# Patient Record
Sex: MALE | Race: BLACK OR AFRICAN AMERICAN | Employment: UNEMPLOYED | URBAN - METROPOLITAN AREA
[De-identification: names, ages, dates, MRNs, and addresses within clinical notes are randomized per-mention and may not be internally consistent; named-entity substitution may affect disease eponyms.]

---

## 2019-11-26 ENCOUNTER — HOSPICE ADMISSION (OUTPATIENT)
Dept: HOSPICE | Facility: HOSPICE | Age: 84
End: 2019-11-26
Payer: MEDICARE

## 2019-11-27 ENCOUNTER — HOSPITAL ENCOUNTER (INPATIENT)
Age: 84
LOS: 6 days | Discharge: HOSPICE | End: 2019-12-03
Attending: INTERNAL MEDICINE | Admitting: INTERNAL MEDICINE

## 2019-11-27 DIAGNOSIS — Z51.5 COMFORT MEASURES ONLY STATUS: ICD-10-CM

## 2019-11-27 DIAGNOSIS — I35.0 AORTIC VALVE STENOSIS, ETIOLOGY OF CARDIAC VALVE DISEASE UNSPECIFIED: ICD-10-CM

## 2019-11-27 DIAGNOSIS — N18.4 CKD (CHRONIC KIDNEY DISEASE), STAGE IV (HCC): ICD-10-CM

## 2019-11-27 DIAGNOSIS — C61 PROSTATE CANCER (HCC): ICD-10-CM

## 2019-11-27 PROCEDURE — 3336500001 HSPC ELECTION

## 2019-11-27 PROCEDURE — 99222 1ST HOSP IP/OBS MODERATE 55: CPT | Performed by: INTERNAL MEDICINE

## 2019-11-27 PROCEDURE — 0656 HSPC GENERAL INPATIENT

## 2019-11-27 RX ORDER — TRAZODONE HYDROCHLORIDE 50 MG/1
100 TABLET ORAL
Status: DISCONTINUED | OUTPATIENT
Start: 2019-11-27 | End: 2019-12-03 | Stop reason: HOSPADM

## 2019-11-27 RX ORDER — HALOPERIDOL 2 MG/ML
1 SOLUTION ORAL
Status: DISCONTINUED | OUTPATIENT
Start: 2019-11-27 | End: 2019-12-03 | Stop reason: HOSPADM

## 2019-11-27 RX ORDER — AMOXICILLIN 250 MG
1 CAPSULE ORAL 2 TIMES DAILY
Status: DISCONTINUED | OUTPATIENT
Start: 2019-11-27 | End: 2019-12-03 | Stop reason: HOSPADM

## 2019-11-27 RX ORDER — FENTANYL CITRATE 50 UG/ML
25 INJECTION, SOLUTION INTRAMUSCULAR; INTRAVENOUS
Status: DISCONTINUED | OUTPATIENT
Start: 2019-11-27 | End: 2019-12-03 | Stop reason: HOSPADM

## 2019-11-27 RX ORDER — FACIAL-BODY WIPES
10 EACH TOPICAL DAILY PRN
Status: DISCONTINUED | OUTPATIENT
Start: 2019-11-27 | End: 2019-12-03 | Stop reason: HOSPADM

## 2019-11-27 RX ORDER — LANOLIN ALCOHOL/MO/W.PET/CERES
3 CREAM (GRAM) TOPICAL
Status: DISCONTINUED | OUTPATIENT
Start: 2019-11-27 | End: 2019-12-03 | Stop reason: HOSPADM

## 2019-11-27 NOTE — HOSPICE
I met with Mr. Stacy Clayton, his brother, nephew, and grandson. Mr. Stacy Clayton indicated that he was comfortable. He was alert and oriented. Mr. Stacy Clayton appeared to tire easily. During the visit, Mr. Stacy Clayton and his family engaged in a life review that focused on his career as a . 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 Mr. Stacy Clayton seemed to find stephenie through his work. Mr. Stacy Clayton was actively involved in a [employer] where he had a leadership role. Mr. Stacy Clayton welcomed prayer at the conclusion of the visit. Mr. John richards was also present in the family room. He appeared to be well supported. Towards the end of the visit, Mr. Stacy Clayton seemed tired. He politely indicated that he needed time to rest. I verbalized understanding and offered a closing prayer. The family seemed appreciative of the visit and prayer.

## 2019-11-27 NOTE — PROGRESS NOTES
1230  Report received from [place].    1240  Pt lying in bed,asleep. Pt aroused to verbal stimuli. No co pain or dypsnea at this time. Numerous family at the bedside. 1  Pt  Visiting with family. No complaints. 1430  Pt asleep  NO facial grimacing, no shortness of breath. 1530  Pt ringing out. Pt stated he needed to use the restroom. Pt on the bed weston. Pt had a very large loose stool but did not void. Pt cleaned up, turned and repositioned. 36  Pt visiting with family. No needs. 1730  Pt getting a bath. Pt turned, repositioned. Pt very happy with his bath. No distress. Pt denies pain, shortness of breath or restlessness. 1830  Pt resting. 1900  Report given.

## 2019-11-27 NOTE — HOSPICE
[facility]  [phone]    Social Work Admission Note  Patient Name: Frandy Watt  YOB: 1931  Age: 80 y.o. Date of Visit: 11/27/19  Facility of Care: [facility]  Patient Room: 11/01     Hospice Attending: George Stinson MD  Hospice Diagnosis: end stage heart disease, ESRD    Level of Care:    [x]  GIP    []  Respite   []  Routine    NARRATIVE   LCSW and medical team met with patient and 2 of his children (Rossana Bautista) at bedside during team rounds. Patient received lying in bed and initially sleeping but able to rouse and speak. He is pleasant and able to answer most questions appropriately however unintelligible at times. Patient had dialysis last on Monday and also has prostate cancer and heart disease. Children have been supportive during hospitalization and though they would take him home, are expecting a diaz decline and hope for patient to stay at [facility]. They are aware that there may be a financial obligation if patient switches to Routine LOC from GIP. Rossana Bautista shared that their mother has Alzheimers and patient has been her caregiver up until September of this year when his health took a turn. The family is working on placement for their mother at this time as well which has been difficult. They do have a large supportive family and will have many visitors today and tomorrow. Family plans to bring Thanksgiving meal to [facility]. LCSW will remain available for support.     ADVANCE CARE PLANNING    Code Status: DNR  Durable DNR: x Yes  _ No  Advance Care Planning 11/27/2019   Patient's Healthcare Decision Maker is: Legal Next of Kin   Confirm Advance Directive Yes, not on file   Does the patient have other document types Do Not Resuscitate       Relationship Status:  []  Single     [x]        []      []  Domestic Partner     []  /  []  Common Law  []    []  Unknown    If in a relationship, name of partner/spouse:  Duration of relationship:    Yazdanism: No Anabaptist on file     Home: TBD  Resources Provided: Supportive counseling, hospice info packet, [facility] for [facility] care    Social Work Initial Assessment     Gender:  male    Race/Ethnicity: ([place] all that apply)  []  American [place] [facility] or [place] Native  []    [x]  Black or [place] American  []   or   []  [facility] or [facility]  []  Rheta Hunting  []  Unknown      Service:    []  Yes   []  No       [x]  Unknown  Appropriate for Pinning Ceremony:   []  Yes      []  No  Is patient using VA benefits?    []  Yes      []  No     Primary Language: English  []   Needed  []   utilized during visit    Ability to express thoughts/needs/feelings  []  Expressed thoughts/feelings/needs without difficulty  [x]  Requires extra time and cuing  []  Speech limited single words  []  Uses only gestures (eye, blinking eye or head movement/pointing)  []  Unable to express thoughts/feelings/needs (speech unintelligible or inappropriate)  []  Unresponsive  Notes:      Mental Status:  []  Alert-oriented to:     []  Person     []  Place     []  Time  []  Comatose-responds to:    []   Verbal stimuli    []  Tactile stimuli    []  Painful stimuli  []  Forgetful  []  Disoriented/Confused  [x]  Lethargic  []  Agitated  []  Other (specify):    Notes:      Patients description of Illness/Current Health Status:    [x]  Patient unable to discuss  []  Patient unwilling to discuss  []  (Specify)        Knowledge/Understanding of Disease Process  Patient:    []  Demonstrates knowledge/understanding of disease process   []  Demonstrates knowledge/understanding of treatment plan   []  Demonstrates knowledge/understanding of prognosis   []  Demonstrates acceptance of prognosis   []  Demonstrates knowledge/understanding of resuscitation status   []  Other (specify)  Caregiver:   [x]  Demonstrates knowledge/understanding of disease process   [x] Demonstrates knowledge/understanding of treatment plan   [x]  Demonstrates knowledge/understanding of prognosis   [x]  Demonstrates acceptance of prognosis   []  Demonstrates knowledge/understanding of resuscitation status   []  Other (specify)  Notes:      Patients living arrangement/care setting:  Use the PRIOR COLUMN when the PATIENTS current health status necessitated a change in his/her primary residence. Prior Current Response              [x]             []    Patients own home/residence              []             []    Home of family member/friend              []             []    Boarding home              []             []    Assisted living facility/longterm center              []             [x]    Hospital/Acute care facility              []             []    Skilled nursing facility              []             []    Long term care facility/Nursing home              []             []    Hospice in Patient      Primary Caregiver:  []  No Primary Caregiver  Name of Primary Caregiver: Rebeca Emery [phone]  Relationship or Primary Caregiver:    []  Spouse/Significant other       [x]  Natural Child        []  Step child       []  Sibling   []  Parent   []  Friend/Neighbor   []  Community/Shinto Volunteer   []  Paid help   []  Other (specify):___________  Notes:       Family members/Significant others:  Name: Carlo Garza  Relationship: Child  Phone Number:  Actively involved in care? [x]  Yes  []  No    Name: Danny Merino  Relationship: Child  Phone Number:  Actively involved in care? [x]  Yes  []  No    Name:  Relationship:  Phone Number:  Actively involved in care?   []  Yes  []  No    Social support systems: (select ONE best description)  [x]  Excellent social support system which includes three or more family members or friends  []  Good social support system which includes two or less members or friends  []  Eleanor Olivoe support which includes one family member or friend  []  Poor social support; no family members or friends; basically ALONE  Notes:      Emotional Status: (myrna all that apply)    Patient Caregiver Response                 []                [x]    Mood/Affect stable and appropriate                   []                []    Angry                 []                []    Anxious                 []                []    Apprehensive                 []                []    Avoidant                 []                []    Clinging                 []                []    Depressed                 []                []    Distraught                 []                []    Elated                 []                []    Euphoric                 []                []    Fearful                 []                []    Flat Affect                 []                []    Helpless                 []                []    Hostile                 []                []    Impulsive                 []                []    Irritable                 []                []    Labile                 []                []    Manic                 []                []    Restlessness                 []                []    Sad                 []                []    Suspicious                 []                []    Tearful                 []                []    Withdrawn     Notes:     Coping Skills (strengths/weakness):    Patient: Coping Skills (strength/weakness):    Family/caregiver (strength/weakness):     [place] of care ([place] all that apply):     [x]  No burden evident   []  Family must administer medications   []  Illness causing financial strain   []  Family/Support feels overwhelmed   []  Family/Support sleep disturbed with patients care   []  Patients care causes extra physical stress  of death  []  Illness causes changes in family lifestyle  []  Illness impacting family/support employment  []  Family experiencing increased time demands  []  Patients behavior endangers family  []  Denial of patients illness  []  Concern over outcome of illness/fear  []  Patients behavior embarrassing to family   Notes:      Risk Factors: (myrna all that apply):    [x]  No burden evident   []  Alcohol abuse  []  Financial resources inadequate to meet basic needs (food/house/etc)  []  Financial resources inadequate to meet health care needs (supplies/equipment/medications)  []  Food/nutrition resources inadequate  []  Home environment unsafe/inadequate for home care  []  Homicidal risk  []  Lives alone or without concerned relatives  []  Multiple medications/complex schedule  []  Physical limitations increase likelihood of falls  []  Plan of care/treatments complicated  []  Substance use/abuse  []  Suicidal risk  []  Visual impairment threatens safety/ability to perform self-care  []  Other (specify):     Abuse/Neglect (actual/potential risks):  [x]  No signs of abuse/neglect  []  History of abuse/neglect                 []  [id number]          []  Sexual  []  History of domestic violence  []  Lacks adequate physical care  []  Lacks emotional nurturing/support  []  Lacks appropriate stimulation/cognitive experiences  []  Left alone inappropriately  []  Lacks necessary supervision  []  Inadequate or delayed medical care  []  Unsafe environment (i.e guns/drug use/history of violence in the home/etc.)  []  Bruising or other physical signs of injury present  []  Other (specify):  Notes:   []  Refer to child/adult protective services      Current Sources of Stress (in Addition to Current Illness):   [x]  None reported  []  Bills/Debt    []  Career/Job change    []   (short term)    []   (long term)    []  Death of a child (recent)    []  Death of a parent (recent)   []  Death of a spouse (recent)   []  Employment status changed   []  Family discord    []  Financial loss/Inadequate inther (specify):come  []  Job loss  []  Legal issues unresolved  []  Lifestyle change  []  Marital discord  []  Marriage within the last year  []  Paperwork (insurance/legal/etc) overwhelming  []  Separation/Divorce  []  Other (specify):  Notes:      Current [employer] Being Utilized     1. [facility] care        Interventions/Plan of Care     1. Assess social and emotional factors related to coping with end of life issues  2. Community resource planning/referral   3. Relocation to different care setting if/when symptoms stabilize    Discharge Planning     1. Will dc home with children should pt stabilize  2.  Final arrangements TBD    MSW Assessment Completed by: Dilan Rosales  11/27/19    Time In: 5564F      Time Out: 12p

## 2019-11-27 NOTE — PROGRESS NOTES
1010 Patient arrived via EMS from [facility]. Patient transferred from stretcher to bed. Patient smiling and laughing pleasantly, no complaints or needs at this time. Patient repositioned in bed with help of JACOB Leyva. 1100 Patient is resting in bed quietly, no complaints or needs at this time. Daughter and son at the bedside. 1200 Patient resting in bed quietly, no complaints or needs at this time, family is at the bedside. 1255 Report given to Vinny.

## 2019-11-27 NOTE — H&P
[facility]  [phone]    Patient Name: Karin Rizo  YOB: 1931    Date of Provider Hospice Visit: 11/27/19    Level of Care:   [x] General Inpatient (GIP)    [] Routine   [] Respite    Current Location of Care:  [] [facility] [] [facility] [] [address] [x] Hospice House [facility], patient referred from:  [] [facility] [] [facility] [] [facility] [] [address] [] Home [x] Other: [facility]     Date of Original Hospice Admission: 1126/19  Hospice Medical Director at time of admission: Dr. Yarelis Holguin Diagnosis: End Stage Cardiac Disease  Diagnoses RELATED to the terminal prognosis: severe Aortic Stenosis, CAD, HTN, CKD4,   Other Diagnoses: Prostate Cancer     HOSPICE SUMMARY     Karin Rizo is a 80y.o. year old who was admitted to [facility]. The patient's principle diagnosis of End Stage Heart Disease is related to his severe Aortic Stenosis, CAD, HTN, and also has secondary affect of CKD IV. He was admitted to the outside hospital for shortness of breath and lower extremity edema indicating heart failure and was being worked up for TAVR procedure but during this process he had a valvuloplasty (11/18/19) and did not tolerate this indicating he would not tolerate the actual TAVR. He was also receiving dialysis because of progressive CKD and elected to discontinue this due to quality of life; he was needing dobutamine to sustain his blood pressure and also could no longer tolerate dialysis. Functionally, the patient's Karnofsky and/or Palliative Performance Scale has declined over a period of months and is estimated at 30. Objective information that support this patients limited prognosis includes: admission creatinine at [facility] was 5; platelets were 62, 568XCM declined to 25,000. He is making very little urine, just a few hundred cc/day at most.      The patient/family chose comfort measures with the support of Hospice.      HOSPICE DIAGNOSES   Active Symptoms:  1. Complex hospitalization transfer  2. Advanced CKD and discontinuation of dialysis  3. Generalized weakness  4. Anorexia  5. Moderate shortness of breath / Class III NYHA symptoms  6. *Patient has a pacemaker but outside notes did not indicate this was an AICD     PLAN   1. Admit GIP for complex hospitalization / recent discontinuation of Dobutamine and high risk of sudden symptomatic decline  2. Reviewed PTA medications; discontinue all oral cardiac medications at this time  3. Note he was on gout medication / allopurinol so this should be noted if he has severe pain in joint while hospitalized  4. Use PRN medications while observing for changes/decline in next 48-72 hours  5. With severe abdominal hernias, keep bowels soft with bowel regimen  6. Insomnia at night: Will add Melatonin. Avoid Ativan due to CKD IV as will last in system a long time. 7.  and SW to support family needs  8. Disposition: Assess over 48-72 hours given severity of medical condition and complex hospitalization with dialysis, dobutamine and thrombocytopenia  9. Hospice Plan of care was reviewed in detail and agree with current plan of care    Prognosis estimated based on 11/27/19 clinical assessment is:   [] Hours to Days    [x] Days to Weeks    [] Other:    Communicated plan of care with: Hospice Case Manager; Hospice IDT; Care Team     GOALS OF CARE     Patient/Medical POA stated Goal of Care: Comfortt    [x] I have reviewed and/or updated ACP information in the Advance Care Planning Navigator. This information is available in the [address] link in the patient's chart header.     Primary Decision [address] ([phone]):   Primary Decision Maker: Knvg Baker - [phone]    Resuscitation Status: DNR  If DNR is there a Durable DNR on file? : [] Yes [] No (If no, complete Durable DNR)  Patient has Durable DNR but not yet scanned    HISTORY     History obtained from: [facility] records, patient & family at bedside (2 of 3 children)    CHIEF COMPLAINT: Tired  The patient is:   [x] Verbal  [] Nonverbal  [] Unresponsive    HPI/SUBJECTIVE:  See summary above. Patient currently has dry mouth and has mild discomfort in abdomen intermittently related to abdominal hernias which are chronic. Breathing is stable at rest but worse if he tries to get up or sit up.       REVIEW OF SYSTEMS     The following systems were: [x] reviewed  [] unable to be reviewed    Positive ROS include:  Constitutional: fatigue, weakness, in pain, short of breath  Ears/nose/mouth/throat: increased airway secretions  Respiratory:shortness of breath, wheezing  Gastrointestinal:poor appetite, nausea, vomiting, abdominal pain, constipation, diarrhea  Musculoskeletal:pain, deformities, swelling legs  Neurologic:confusion, hallucinations, weakness  Psychiatric:anxiety, feeling depressed, poor sleep  Endocrine:     Adult Non-Verbal Pain Assessment Score: N/A    Face  [] 0   No particular expression or smile  [] 1   Occasional grimace, tearing, frowning, wrinkled forehead  [] 2   Frequent grimace, tearing, frowning, wrinkled forehead    Activity (movement)  [] 0   Lying quietly, normal position  [] 1   Seeking attention through movement or slow, cautious movement  [] 2   Restless, excessive activity and/or withdrawal reflexes    Guarding  [] 0   Lying quietly, no positioning of hands over areas of body  [] 1   Splinting areas of the body, tense  [] 2   Rigid, stiff    Physiology (vital signs)  [] 0   Stable vital signs  [] 1   Change in any of the following: SBP > 20mm Hg; HR > 20/minute  [] 2   Change in any of the following: SBP > 30mm Hg; HR > 25/minute    Respiratory  [] 0   Baseline RR/SpO2, compliant with ventilator  [] 1   RR > 10 above baseline, or 5% drop SpO2, mild asynchrony with ventilator  [] 2   RR > 20 above baseline, or 10% drop SpO2, asynchrony with ventilator     FUNCTIONAL ASSESSMENT     Palliative Performance Scale (PPS): PSYCHOSOCIAL/SPIRITUAL ASSESSMENT     Active Problems:    * No active hospital problems. *    No past medical history on file. No past surgical history on file. Social History     Tobacco Use    Smoking status: Not on file   Substance Use Topics    Alcohol use: Not on file     No family history on file. No Known Allergies   Current Facility-Administered Medications   Medication Dose Route Frequency    bisacodyl (DULCOLAX) suppository 10 mg  10 mg Rectal DAILY PRN        PHYSICAL EXAM     Wt Readings from Last 3 Encounters:   No data found for Wt       Visit Vitals  BP 90/50   Pulse 87   Temp 96.9 °F (36.1 °C)   Resp 18       Supplemental O2  [] Yes  [x] NO  Last bowel movement: See nursing note    Currently this patient has:  [x] Peripheral IV [] PICC  [] PORT [] ICD    [] [place] Catheter [] NG Tube   [] PEG Tube    [] Rectal Tube [] Drain  [x] Other: Pacemaker    . Constitutional: alert, oriented, smiles and participates when asked  Eyes: pupils equal, anicteric  ENMT: no nasal discharge, dry oral mucous membranes  Cardiovascular: regular at time of exam  Respiratory: breathing not labored, symmetric, clear anteriorly  Gastrointestinal: soft non-tender, +bowel sounds, large abdominal reducible hernias, groin bandages from prior catheterizations (valvuloplasty)  Musculoskeletal: no deformity, no tenderness to palpation. , 1+ edema  Skin: warm, dry  Neurologic: following commands, moving all extremities  Psychiatric: full affect, no hallucinations  Other:     Pertinent Lab and or Imaging Tests:  No results found for: NA, K, CL, CO2, AGAP, GLU, BUN, CREA, BUCR, GFRAA, GFRNA, CA, GFRAA  No results found for: TP, ALBR, TALB, ALB        Total time:   Counseling / coordination time:   > 50% counseling / coordination?:

## 2019-11-28 PROCEDURE — 0656 HSPC GENERAL INPATIENT

## 2019-11-28 PROCEDURE — G0299 HHS/HOSPICE OF RN EA 15 MIN: HCPCS

## 2019-11-28 PROCEDURE — 74011250637 HC RX REV CODE- 250/637: Performed by: INTERNAL MEDICINE

## 2019-11-28 RX ADMIN — SENNOSIDES AND DOCUSATE SODIUM 1 TABLET: 8.6; 5 TABLET ORAL at 09:00

## 2019-11-28 NOTE — PROGRESS NOTES
26- received report from [place], [address] [place] [address]- assessment done, patient is alert and oriented x3, speech is jumbled at times, lung sounds clear, diminished at base, bowel sounds heard in all quads, abdomen soft, non tender, patient has +1 non pitting edema noted to BLE, denies pain, no shortness of breath noted  2130- patient in bed resting quietly, family remains at bedside  2225- patient resting quietly, daughters remain at bedside  2300- patient continues to rest quietly, watching tv  0005- patient continues to rest quietly, no signs or symptoms of pain or discomfort, no shortness of breath noted  0112- patient continues to rest quietly, daughters at bedside   0200- patient continues to rest quietly  0315- patient in bed watching tv, denies any pain or discomfort  0400- patient resting quietly, daughters continue at bedside  0500- patient requested to sit on side of the bed, denies any pain, no dizziness noted, daughters at bedside  0620- patient in bed watching tv, daughters remain at bedside        NAME OF PATIENT:  [facility]:  [facility]    REASON FOR GIP:   Stabilizing treatment that cannot take place at home    *PATIENT REMAINS ELIGIBLE FOR [facility] LEVEL OF CARE AS EVIDENCED BY: stabilizing treatment that cannot take place at home      REASON FOR RESPITE:  N/A    O2 SAFETY:  N/A    FALL INTERVENTIONS PROVIDED:   Implemented/recommended use of non-skid footwear, Implemented/recommended assistive devices and encouraged their use, Implemented/recommended environmental changes (remove hazards, lower bed, improve lighting, etc.) and Implemented/recommended increased supervision/assistance    INTERDISPLINARY COMMUNICATION/COLLABORATION:  Physician, CHERIE, Christal Garcia RN, CNA and LPN    NEW MEDICATION INITIATION DOCUMENTATION:  N/A    Reason medication is being initiated:  N/A    MD / Provider name consulted re: change in status / initiation of new medication:  N/A    New Symptom(s):  N/A    New Order(s): N/A    Name of the person notified of the changes:  N/A    Name of person being taught:  N/A    Instructions given:  N/A    Side Effects taught:  N/A    Response to teaching:  N/A      COMFORTABLE DYING MEASURE:  Is Patient/family satisfied with symptom level?  yes    DISCHARGE PLAN:  END OF LIFE

## 2019-11-28 NOTE — PROGRESS NOTES
Problem: Pressure Injury - Risk of  Goal: *Prevention of pressure injury  Description  Document Hayden Scale and appropriate interventions in the flowsheet. Outcome: Not Met  Note: Pressure Injury Interventions:  Sensory Interventions: Assess changes in LOC, Monitor skin under medical devices, Use 30-degree side-lying position, Turn and reposition approx. every two hours (pillows and wedges if needed), Minimize linen layers, Keep linens dry and wrinkle-free, Chair cushion    Moisture Interventions: Absorbent underpads, Moisture barrier, Apply protective barrier, creams and emollients, Check for incontinence Q2 hours and as needed, Limit adult briefs, Minimize layers    Activity Interventions: Assess need for specialty bed    Mobility Interventions: Float heels, Assess need for specialty bed, Chair cushion, Turn and reposition approx. every two hours(pillow and wedges)    Nutrition Interventions: Document food/fluid/supplement intake    Friction and Shear Interventions: Apply protective barrier, creams and emollients, Lift sheet, Minimize layers, Feet elevated on foot rest, HOB 30 degrees or less                Problem: Falls - Risk of  Goal: *Absence of Falls  Description  Document Obdulio Fall Risk and appropriate interventions in the flowsheet.   Outcome: Not Met  Note: Fall Risk Interventions:  Mobility Interventions: Bed/chair exit alarm, Patient to call before getting OOB, Communicate number of staff needed for ambulation/transfer    Mentation Interventions: Adequate sleep, hydration, pain control, Bed/chair exit alarm, Door open when patient unattended    Medication Interventions: Bed/chair exit alarm, Assess postural VS orthostatic hypotension    Elimination Interventions: Bed/chair exit alarm, Call light in reach

## 2019-11-28 NOTE — PROGRESS NOTES
NAME OF PATIENT:  [place] [facility]    LEVEL OF CARE:  GIP    REASON FOR GIP:   Medication adjustment that must be monitored 24/7 and Stabilizing treatment that cannot take place at home    *PATIENT REMAINS ELIGIBLE FOR GIP LEVEL OF CARE AS EVIDENCED BY: Patient receiving stabilizing treatment. O2 SAFETY:  Concentrator positioning (6\" from furniture/drapes) and Oxygen sign on the door    FALL INTERVENTIONS PROVIDED:   Implemented/recommended use of non-skid footwear, Implemented/recommended use of fall risk identification flag to all team members, Implemented/recommended assistive devices and encouraged their use, Implemented/recommended resources for alarm system (personal alarm, bed alarm, call bell, etc.) , Implemented/recommended environmental changes (remove hazards, lower bed, improve lighting, etc.) and Implemented/recommended increased supervision/assistance    INTERDISPLINARY COMMUNICATION/COLLABORATION:  Physician, MSW, [place] and RN, CNA      Reason medication is being initiated:      MD / Provider name consulted re: change in status / initiation of new medication:      New Symptom(s):      New Order(s):      Name of the person notified of the changes:      Name of person being taught:      Instructions given:      Side Effects taught:      Response to teaching:        COMFORTABLE DYING MEASURE:  Is Patient/family satisfied with symptom level?  yes    DISCHARGE PLAN:  Pending.      0700 Report received from [facility]. GIP LOC. DX End stage heart disease. 0800 Patient is awake, alert and oriented x 3 in room. Daughters at bedside. Patient denies pain. Speech is soft. Assessment completed - see flowsheets. Patient expressed desire to get out of bed. Patient placed in recliner in room - 2 person assist, pivot to chair. 3178 Patient up to bedside commode. Small amount of urine and bowel movement. 0830 Patient back in recliner, watching television. 5 Family in room with patient.  Television on.  1000 Patient sitting up in chair. Denies pain. No needs at this time. 1045 Patient assisted back to bed. One person assist. Dyspnea on exertion. 1130 Patient resting in bed. Eyes closed. No signs of distress. 1230 Patient resting quietly. Breathing is non-labored. Appears to be sleeping. 1320 Patient awake in bed watching television. No needs at this time. Denies pain. 1430 Family in room with patient. Television on in room. [phone] Patient up to chair and moved to family room to spend time with several family members that came in for Thanksgiving. 1545 Patient remains in family room with guests. No needs at this time. 1630 Patient in family room. Denies pain. No needs at this time. 1720 Patient moved back to room. 2 person assist to bed. 1800 Patient stated he was feeling short of breath and wanted to be put on oxygen. 2LPM initiated. 1825 Patient watching television in room. Stated he is feeling better on the oxygen.

## 2019-11-29 PROCEDURE — G0300 HHS/HOSPICE OF LPN EA 15 MIN: HCPCS

## 2019-11-29 PROCEDURE — 99232 SBSQ HOSP IP/OBS MODERATE 35: CPT | Performed by: FAMILY MEDICINE

## 2019-11-29 PROCEDURE — 3336590001 HSPC ROOM AND BOARD

## 2019-11-29 PROCEDURE — G0299 HHS/HOSPICE OF RN EA 15 MIN: HCPCS

## 2019-11-29 PROCEDURE — 0651 HSPC ROUTINE HOME CARE

## 2019-11-29 PROCEDURE — 74011250637 HC RX REV CODE- 250/637: Performed by: INTERNAL MEDICINE

## 2019-11-29 RX ADMIN — SENNOSIDES AND DOCUSATE SODIUM 1 TABLET: 8.6; 5 TABLET ORAL at 17:46

## 2019-11-29 RX ADMIN — SENNOSIDES AND DOCUSATE SODIUM 1 TABLET: 8.6; 5 TABLET ORAL at 09:00

## 2019-11-29 NOTE — PROGRESS NOTES
Pt turned and repositioned with pillow placement for pressure areas. Call [facility] within reach. Bed Alarm on.     PRN meds available for comfort

## 2019-11-29 NOTE — PROGRESS NOTES
[facility] Help to Those in Need  [phone]    Patient Name: Kayla Moffett  YOB: 1931    Date of Provider Hospice Visit: 11/29/19    Level of Care:   [] General Inpatient (GIP)    [x] Routine   [] Respite    Current Location of Care:  [] [facility] [] [facility] [] [facility] [] [facility] [x] Hospice House [facility]    IF [facility], patient referred from:  [] [facility] [] [facility] [] [facility] [] [facility] [] Home [x] Other: [facility]     Date of Original Hospice Admission: 1126/19  Hospice Medical Director at time of admission: Dr. Dalila Gill Diagnosis: End Stage Cardiac Disease  Diagnoses RELATED to the terminal prognosis: severe Aortic Stenosis, CAD, HTN, CKD4,   Other Diagnoses: Prostate Cancer     HOSPICE SUMMARY     Kayla Moffett is a 80y.o. year old who was admitted to [facility]. The patient's principle diagnosis of End Stage Heart Disease is related to his severe Aortic Stenosis, CAD, HTN, and also has secondary affect of CKD IV. He was admitted to the outside hospital for shortness of breath and lower extremity edema indicating heart failure and was being worked up for TAVR procedure but during this process he had a valvuloplasty (11/18/19) and did not tolerate this indicating he would not tolerate the actual TAVR. He was also receiving dialysis because of progressive CKD and elected to discontinue this due to quality of life; he was needing dobutamine to sustain his blood pressure and also could no longer tolerate dialysis. Functionally, the patient's Karnofsky and/or Palliative Performance Scale has declined over a period of months and is estimated at 30. Objective information that support this patients limited prognosis includes: admission creatinine at [facility] was 5; platelets were 62, 266ZEW declined to 25,000. He is making very little urine, just a few hundred cc/day at most.      The patient/family chose comfort measures with the support of Hospice.      HOSPICE DIAGNOSES   Active Symptoms:  1. Complex hospitalization transfer  2. Advanced CKD and discontinuation of dialysis  3. Generalized weakness  4. Anorexia  5. Moderate shortness of breath / Class III NYHA symptoms  6. *Patient has a pacemaker but outside notes did not indicate this was an AICD     PLAN   1. Transition to [address] as pt's sxs have remained stable  2. Reviewed PTA medications; discontinued all oral cardiac medications at time of admission  3. Note he was on gout medication / allopurinol so this should be noted if he has severe pain in joint while hospitalized  4. With severe abdominal hernias, keep bowels soft with bowel regimen  5. Insomnia at night: continue melatonin. Avoid Ativan due to CKD IV as will last in system a long time. 6.  and SW to support family needs  7. Disposition: SW to assist with dispo with transition to [address]  8. Hospice Plan of care was reviewed in detail and agree with current plan of care    Prognosis estimated based on 11/29/19 clinical assessment is:   [] Hours to Days    [x] Days to Weeks    [] Other:    Communicated plan of care with: Hospice Case Manager; Hospice IDT; Care Team     GOALS OF CARE     Patient/Medical POA stated Goal of Care: Comfortt    [x] I have reviewed and/or updated ACP information in the Advance Care Planning Navigator. This information is available in the [address] link in the patient's chart header. Primary Decision [facility] Agent):   Primary Decision Maker: Marimar Hunt  Child - [phone]    Resuscitation Status: DNR  If DNR is there a Durable DNR on file? : [] Yes [] No (If no, complete Durable DNR)  Patient has Durable DNR but not yet scanned    HISTORY     History obtained from: [id number] records, patient & family at bedside (2 of 3 children)    CHIEF COMPLAINT: Tired  The patient is:   [x] Verbal  [] Nonverbal  [] Unresponsive    HPI/SUBJECTIVE:  See summary above. Patient slept well last evening with melatonin.  He had an episode of SOA yesterday but none currently. Currently resting but awakes to conversation. Denies pain. He was up to [facility] today. He hasn't had much of an appetite. His family believes he is overall comfortable. 0 prn medications in last 24hrs. REVIEW OF SYSTEMS     The following systems were: [x] reviewed  [] unable to be reviewed    Positive ROS include:  Constitutional: fatigue, weakness, in pain, intermittent short of breath  Ears/nose/mouth/throat: increased airway secretions  Respiratory:shortness of breath, wheezing  Gastrointestinal:poor appetite, nausea, vomiting, abdominal pain, constipation, diarrhea  Musculoskeletal:pain, deformities, swelling legs  Neurologic:confusion, hallucinations, weakness  Psychiatric:anxiety, feeling depressed, poor sleep  Endocrine:     Adult Non-Verbal Pain Assessment Score: N/A    Face  [x] 0   No particular expression or smile  [] 1   Occasional grimace, tearing, frowning, wrinkled forehead  [] 2   Frequent grimace, tearing, frowning, wrinkled forehead    Activity (movement)  [x] 0   Lying quietly, normal position  [] 1   Seeking attention through movement or slow, cautious movement  [] 2   Restless, excessive activity and/or withdrawal reflexes    Guarding  [x] 0   Lying quietly, no positioning of hands over areas of body  [] 1   Splinting areas of the body, tense  [] 2   Rigid, stiff    Physiology (vital signs)  [x] 0   Stable vital signs  [] 1   Change in any of the following: SBP > 20mm Hg; HR > 20/minute  [] 2   Change in any of the following: SBP > 30mm Hg; HR > 25/minute    Respiratory  [x] 0   Baseline RR/SpO2, compliant with ventilator  [] 1   RR > 10 above baseline, or 5% drop SpO2, mild asynchrony with ventilator  [] 2   RR > 20 above baseline, or 10% drop SpO2, asynchrony with ventilator     FUNCTIONAL ASSESSMENT     Palliative Performance Scale (PPS): 40       PSYCHOSOCIAL/SPIRITUAL ASSESSMENT     Active Problems:    * No active hospital problems.  *    No past medical history on file. No past surgical history on file. Social History     Tobacco Use    Smoking status: Not on file   Substance Use Topics    Alcohol use: Not on file     No family history on file. No Known Allergies   Current Facility-Administered Medications   Medication Dose Route Frequency    bisacodyl (DULCOLAX) suppository 10 mg  10 mg Rectal DAILY PRN    senna-docusate (PERICOLACE) 8.6-50 mg per tablet 1 Tab  1 Tab Oral BID    melatonin tablet 3 mg (Patient Supplied)  3 mg Oral QHS    fentaNYL citrate (PF) injection 25 mcg  25 mcg IntraVENous Q15MIN PRN    haloperidol (HALDOL) 2 mg/mL oral solution 1 mg  1 mg Oral Q6H PRN    traZODone (DESYREL) tablet 100 mg  100 mg Oral QHS PRN        PHYSICAL EXAM     Wt Readings from Last 3 Encounters:   No data found for Wt       Visit Vitals  BP (!) 82/50   Pulse 85   Temp 96.3 °F (35.7 °C)   Resp 16   SpO2 100%       Supplemental O2  [] Yes  [x] NO  Last bowel movement: See nursing note    Currently this patient has:  [x] Peripheral IV [] PICC  [] PORT [] ICD    [] [place] Catheter [] NG Tube   [] PEG Tube    [] Rectal Tube [] Drain  [x] Other: Pacemaker    Constitutional: alert, oriented, smiles and participates in conversation when asked  Eyes: pupils equal, anicteric  ENMT: no nasal discharge, dry oral mucous membranes  Cardiovascular: regular at time of exam  Respiratory: breathing not labored, symmetric, clear anteriorly  Gastrointestinal: soft non-tender, +bowel sounds, large abdominal reducible hernias, groin bandages from prior catheterizations (valvuloplasty)  Musculoskeletal: no deformity, no tenderness to palpation. , 1+ edema  Skin: warm, dry  Neurologic: following commands, moving all extremities  Psychiatric: full affect, no hallucinations          Total time: 25 minutes  Counseling / coordination time: 15  > 50% counseling / coordination?: yes    Arin Bunch MD

## 2019-11-29 NOTE — PROGRESS NOTES
Problem: Pressure Injury - Risk of  Goal: *Prevention of pressure injury  Description  Document Hayden Scale and appropriate interventions in the flowsheet. Outcome: Progressing Towards Goal  Note: Pressure Injury Interventions:  Sensory Interventions: Assess changes in LOC, Keep linens dry and wrinkle-free    Moisture Interventions: Absorbent underpads    Activity Interventions: Pressure redistribution bed/mattress(bed type)    Mobility Interventions: Float heels, Pressure redistribution bed/mattress (bed type)    Nutrition Interventions: Document food/fluid/supplement intake    Friction and Shear Interventions: Apply protective barrier, creams and emollients, Lift sheet, Minimize layers, Feet elevated on foot rest, HOB 30 degrees or less                Problem: Falls - Risk of  Goal: *Absence of Falls  Description  Document Obdulio Fall Risk and appropriate interventions in the flowsheet.   Outcome: Progressing Towards Goal  Note: Fall Risk Interventions:  Mobility Interventions: Bed/chair exit alarm    Mentation Interventions: Bed/chair exit alarm, Door open when patient unattended    Medication Interventions: Bed/chair exit alarm    Elimination Interventions: Call light in reach

## 2019-11-29 NOTE — HOSPICE
LCSW met with patient and family members at bedside including son, Jaden Mcconnell. They state they are doing well and patient was awake and able to speak some today. Family continuing to enjoy their time and visit with patient. LCSW will continue to follow for support during [facility] stay.     CHERIE Franz, [facility]   [phone]

## 2019-11-29 NOTE — PROGRESS NOTES
0700  Report received. 0745  Pt lying in bed, awake. Pt is alert and oriented. No co pain or dyspnea. Lungs clear but diminished. No cough. + bowel sounds. Last reported BM was 11-28. Abd is soft and intact. Pt voids in a urinal.  Trace edema in R LE. Pt has a mepilex border on his sacrum and 2 transparent dressings on his bilateral groin. Pt repositioned in bed.    0900  Pt tolerated his Senna without difficulty. 1000  Pt asleep. 1100  Pt continues to sleep. No facial grimacing or moaning. 1210  Pt sitting up in bed, visiting with his Daughter. 1320  Pt did not eat anything for lunch. Pt states he does not have a appetite. 1500 Level of care changed to Routine. Pt asleep. No facial grimacing. 1710  Pt refusing to eat dinner. Pt states he is not hungry. No needs. Ayush Betancourt with Yoan Griffin (daughter) and advised her that Mr Audrey Valenzuela level of care changed from GIP to Routine. Daughter aware that they have to pay out of pocket. Yoan Griffin was very appreciative of the information, support and care that her Father has gotten at the [facility]. 1900  Report given    NAME OF PATIENT:  Barbie Ramirez    LEVEL OF CARE:   Level of care changed from GIP to Routine. REASON FOR GIP:   n/a    *PATIENT REMAINS ELIGIBLE FOR [facility] LEVEL OF CARE AS EVIDENCED BY: (MUST BE ADDRESSED OF PATIENT GIP)  n/a      REASON FOR RESPITE: n/a      O2 SAFETY:  2lpm prn  Oxygen sign on the door    FALL INTERVENTIONS PROVIDED:   Implemented/recommended resources for alarm system (personal alarm, bed alarm, call bell, etc.)  and Implemented/recommended environmental changes (remove hazards, lower bed, improve lighting, etc.)    INTERDISPLINARY COMMUNICATION/COLLABORATION:  Physician, MSW, [place] and RN, CNA    NEW MEDICATION INITIATION DOCUMENTATION:  No new medications initiated.       Reason medication is being initiated:  n/a    MD / Provider name consulted re: change in status / initiation of new medication: n/a    New Symptom(s):  n/a    New Order(s):  n/a    Name of the person notified of the changes:  n/a    Name of person being taught:  n/a    Instructions given:  n/a    Side Effects taught:  n/a    Response to teaching:  n/a      COMFORTABLE DYING MEASURE:  Is Patient/family satisfied with symptom level?  yes    DISCHARGE PLAN:  Pt will remain at the [facility] for possible EOL care

## 2019-11-29 NOTE — PROGRESS NOTES
NAME OF PATIENT:  Sean Toussaint    LEVEL OF CARE:  GIP    REASON FOR GIP:   Stabilizing treatment that cannot take place at home    *PATIENT REMAINS ELIGIBLE FOR GIP LEVEL OF CARE AS EVIDENCED BY: Need for frequent observation with possible interventions to aid comfort    O2 SAFETY:  Concentrator positioning (6\" from furniture/drapes)    FALL INTERVENTIONS PROVIDED:   Implemented/recommended use of non-skid footwear and Implemented/recommended increased supervision/assistance    INTERDISPLINARY COMMUNICATION/COLLABORATION:  Physician, MSW, [place] and RN, CNA    NEW MEDICATION INITIATION DOCUMENTATION:  N/A    Reason medication is being initiated:  N/A    MD / Provider name consulted re: change in status / initiation of new medication:  N/A    New Symptom(s):  N/A    New Order(s):  N/A    Name of the person notified of the changes:  N/A    Name of person being taught:  N/A    Instructions given:  N/A    Side Effects taught:  N/A    Response to teaching:  N/A      COMFORTABLE DYING MEASURE:  Is Patient/family satisfied with symptom level?  yes    DISCHARGE PLAN:  Pending  1900  Report from previous shift  1937  Client son present. Denies c/os. Took O2 off. Multiple family members saying good byes. Will continue to monitor  2039  PA completed. Client with R lower leg mepilex DDI. Sacral mepilex DDI -reported skin tear per previous shift. B groin DDI  Transparent dressings. Severe protrusion on R side of abdomen noted. Denies c/os. Multiple family members in room. Client denies respiratory distress without O2  O2 sats at 93%  Will continue to monitor  2148  Resting quietly and straightened arm for me at request.  PIVs x 2 both flushed without complications. Client denies need to replace O2. DTRs in room and aware I have to check in frequently. Bed alarm turned on at this time. Melatonin not in bin - unable to give. Will continue to monitor  [phone]  Call from family. Client requested to go to . Assisted with CNA to [facility] for urine output and formed BM noted- soft. Client dyspneic with exert and O2 reapplied at 2 L with client in agreement. Will continue to monitor   0013  Resting quietly with unlabored respirations. O2 on at present. Will continue to monitor  0109  Now on L side. Respirations unlabored appearance. Will continue to monitor  0210  Sleeping on L side with unlabored respiraitons noted. Will continue to monitor  0331  No signs of distress. Continues to wear O2. Will continue to monitor  0426  Resting quietly on back with unlabored respiration. Relaxed facial expression and relaxed extremities- has removed O2. Will continue to monitor  [phone]  Continues to rest quietly without signs of distress. 2 children sleeping in room. Will continue to monitor  0636  Resting quietly without signs of distress. Now on his back. Sister and brother in room.   0700  Report to oncoming shift

## 2019-11-30 PROCEDURE — 74011250636 HC RX REV CODE- 250/636: Performed by: INTERNAL MEDICINE

## 2019-11-30 PROCEDURE — 74011250637 HC RX REV CODE- 250/637: Performed by: INTERNAL MEDICINE

## 2019-11-30 PROCEDURE — 0651 HSPC ROUTINE HOME CARE

## 2019-11-30 PROCEDURE — 3336590001 HSPC ROOM AND BOARD

## 2019-11-30 RX ADMIN — FENTANYL CITRATE 25 MCG: 50 INJECTION INTRAMUSCULAR; INTRAVENOUS at 20:34

## 2019-11-30 RX ADMIN — SENNOSIDES AND DOCUSATE SODIUM 1 TABLET: 8.6; 5 TABLET ORAL at 09:14

## 2019-11-30 RX ADMIN — SENNOSIDES AND DOCUSATE SODIUM 1 TABLET: 8.6; 5 TABLET ORAL at 17:21

## 2019-11-30 NOTE — PROGRESS NOTES
NAME OF PATIENT:  France Mason    LEVEL OF CARE:  Routine     REASON FOR GIP:   N/A    *PATIENT REMAINS ELIGIBLE FOR GIP LEVEL OF CARE AS EVIDENCED BY: N/A      REASON FOR RESPITE:  N/A    O2 SAFETY:  Concentrator positioning (6\" from furniture/drapes), Tanks stored in cristina  and No petroleum based products on face while oxygen in use    FALL INTERVENTIONS PROVIDED:   Implemented/recommended assistive devices and encouraged their use, Implemented/recommended resources for alarm system (personal alarm, bed alarm, call bell, etc.) , Implemented/recommended environmental changes (remove hazards, lower bed, improve lighting, etc.) and Implemented/recommended increased supervision/assistance    INTERDISPLINARY COMMUNICATION/COLLABORATION:  Physician, CHERIE, Christal Garcia RN, CNA and LPN    NEW MEDICATION INITIATION DOCUMENTATION:  N/A    Reason medication is being initiated:  N/A    MD / Provider name consulted re: change in status / initiation of new medication:  N/A    New Symptom(s):  N/A    New Order(s):  N/A    Name of the person notified of the changes:  N/A    Name of person being taught:  N/A    Instructions given:  N/A    Side Effects taught:  N/A    Response to teaching:  N/A      COMFORTABLE DYING MEASURE:  Is Patient/family satisfied with symptom level?  yes    DISCHARGE PLAN:  Pending        1910- report received from [address]- assessment done, patient remains in bed with family at bedside, alert and oriented x2-3, speech is garbled at times, able to respond on command, lungs clear, diminished throughout, no shortness of breath noted, abdomen soft, non distended, bowel sounds heard in a quadrants, dressings to right lower leg, sacrum as well dialysis sites remain dry and intact, has +1 edema to BLE, denies any pain or discomfort  2030- patient repositioned in bed, denies any pain or discomfort, no shortness of breath noted  2200- patient resting quietly, son at bedside  0045- patient was assisted to bedside commode, linen and gown changed, small formed bowel movement noted  0250- patient resting quietly, respirations even and unlabored, no signs or symptoms of pain or discomfort, no shortness of breath noted  0420- patient continues to rest quietly, son remains at bedside  0615- patient offered to assist to bedside commode, patient declined, continues to rest quietly

## 2019-11-30 NOTE — PROGRESS NOTES
Problem: Pressure Injury - Risk of  Goal: *Prevention of pressure injury  Description  Document Hayden Scale and appropriate interventions in the flowsheet. Outcome: Progressing Towards Goal  Note: Pressure Injury Interventions:  Sensory Interventions: Assess changes in LOC, Float heels, Pressure redistribution bed/mattress (bed type), Keep linens dry and wrinkle-free, Minimize linen layers    Moisture Interventions: Absorbent underpads, Apply protective barrier, creams and emollients, Minimize layers    Activity Interventions: Pressure redistribution bed/mattress(bed type)    Mobility Interventions: Pressure redistribution bed/mattress (bed type), HOB 30 degrees or less    Nutrition Interventions: Offer support with meals,snacks and hydration, Document food/fluid/supplement intake    Friction and Shear Interventions: Apply protective barrier, creams and emollients, Lift sheet, Minimize layers                Problem: Falls - Risk of  Goal: *Absence of Falls  Description  Document Obdulio Fall Risk and appropriate interventions in the flowsheet. Outcome: Progressing Towards Goal  Note: Fall Risk Interventions:  Mobility Interventions: Bed/chair exit alarm, Patient to call before getting OOB    Mentation Interventions: Adequate sleep, hydration, pain control, Door open when patient unattended, More frequent rounding, Bed/chair exit alarm    Medication Interventions: Bed/chair exit alarm, Patient to call before getting OOB    Elimination Interventions: Call light in reach, Bed/chair exit alarm              Problem: Comfort Deficit  Goal: Reduce/control pain  Description  Patient will report that pain has been reduced or controlled through verbal and nonverbal means and that measures to promote comfort are effective. Outcome: Progressing Towards Goal  Note:   Patient has denied pain today. PRN medications available, if needed. Continue to assess, monitor and treat per POC.

## 2019-11-30 NOTE — PROGRESS NOTES
0700-Report received from [address]  [id number] asleep in bed  0900-Assessment completed; scheduled pericolace administered; patient sat up for breakfast-ate 75%; patient denies pain, SOB or discomfort  NEURO: patient alert and appropriately interactive; slightly garbled speech; oriented to person, place and current situation  RESP: on room air, lungs clear but diminished with some expiratory wheezes  : incontinent vs urinal vs bedside commode  GI: takes po without difficulty; bowel sounds present in all quadrants; last BM 11/29  MUSCK: generalized weakness; up with max assitance  SKIN: intact; 2 PIV in right arm  1100-Family and visitors at bedside; pt denies needs  1300-Patient sitting up in bed eating lunch--ate 25%  1425-patient turned on left side by CNA--pt asleep in bed; easily aroused; denies pain or discomfort; family present at bedside  1600-Patient being bathed by CNA Charu  1645-patient positioned on right side  1724-Scheduled stool softener administered  1752-patient sitting up in bed eating dinner--patient did not eat anything  1830-patient turned on left side  1900-Report given to [address] PATIENT:  Sowmya Condon    LEVEL OF CARE:  routine    REASON FOR GIP:   n/a    *PATIENT REMAINS ELIGIBLE FOR GIP LEVEL OF CARE AS EVIDENCED BY: (MUST BE ADDRESSED OF PATIENT GIP)      REASON FOR RESPITE:  n/a    O2 SAFETY:  n/a    FALL INTERVENTIONS PROVIDED:   Implemented/recommended use of non-skid footwear, Implemented/recommended use of fall risk identification flag to all team members, Implemented/recommended assistive devices and encouraged their use, Implemented/recommended resources for alarm system (personal alarm, bed alarm, call bell, etc.) , Implemented/recommended environmental changes (remove hazards, lower bed, improve lighting, etc.) and Implemented/recommended increased supervision/assistance    INTERDISPLINARY COMMUNICATION/COLLABORATION:  Physician, MSW, [place] and RN, CNA    NEW MEDICATION INITIATION DOCUMENTATION:  n/a    Reason medication is being initiated:  n/a    MD / Provider name consulted re: change in status / initiation of new medication:  n/a    New Symptom(s):  n/a    New Order(s):  n/a    Name of the person notified of the changes:  n/a    Name of person being taught:  n/a    Instructions given:  n/a    Side Effects taught:  n/a    Response to teaching:  n/a      COMFORTABLE DYING MEASURE:  Is Patient/family satisfied with symptom level?  yes    DISCHARGE PLAN:  pending

## 2019-12-01 PROCEDURE — G0299 HHS/HOSPICE OF RN EA 15 MIN: HCPCS

## 2019-12-01 PROCEDURE — G0300 HHS/HOSPICE OF LPN EA 15 MIN: HCPCS

## 2019-12-01 PROCEDURE — 74011000250 HC RX REV CODE- 250: Performed by: INTERNAL MEDICINE

## 2019-12-01 PROCEDURE — 0651 HSPC ROUTINE HOME CARE

## 2019-12-01 PROCEDURE — 74011250637 HC RX REV CODE- 250/637: Performed by: INTERNAL MEDICINE

## 2019-12-01 PROCEDURE — 3336590001 HSPC ROOM AND BOARD

## 2019-12-01 RX ORDER — IPRATROPIUM BROMIDE AND ALBUTEROL SULFATE 2.5; .5 MG/3ML; MG/3ML
3 SOLUTION RESPIRATORY (INHALATION)
Status: DISCONTINUED | OUTPATIENT
Start: 2019-12-01 | End: 2019-12-03 | Stop reason: HOSPADM

## 2019-12-01 RX ORDER — MORPHINE SULFATE 100 MG/5ML
5 SOLUTION ORAL
Status: DISCONTINUED | OUTPATIENT
Start: 2019-12-01 | End: 2019-12-03 | Stop reason: HOSPADM

## 2019-12-01 RX ADMIN — SENNOSIDES AND DOCUSATE SODIUM 1 TABLET: 8.6; 5 TABLET ORAL at 17:22

## 2019-12-01 RX ADMIN — IPRATROPIUM BROMIDE AND ALBUTEROL SULFATE 3 ML: .5; 3 SOLUTION RESPIRATORY (INHALATION) at 17:38

## 2019-12-01 RX ADMIN — SENNOSIDES AND DOCUSATE SODIUM 1 TABLET: 8.6; 5 TABLET ORAL at 09:00

## 2019-12-01 NOTE — PROGRESS NOTES
Problem: Pressure Injury - Risk of  Goal: *Prevention of pressure injury  Description  Document Hayden Scale and appropriate interventions in the flowsheet. Note: Pressure Injury Interventions:  Sensory Interventions: Assess changes in LOC, Float heels, Pressure redistribution bed/mattress (bed type), Keep linens dry and wrinkle-free, Minimize linen layers    Moisture Interventions: Absorbent underpads, Apply protective barrier, creams and emollients, Minimize layers    Activity Interventions: Pressure redistribution bed/mattress(bed type)    Mobility Interventions: Pressure redistribution bed/mattress (bed type), HOB 30 degrees or less    Nutrition Interventions: Offer support with meals,snacks and hydration, Document food/fluid/supplement intake    Friction and Shear Interventions: Apply protective barrier, creams and emollients, Lift sheet, Minimize layers                Problem: Pressure Injury - Risk of  Goal: *Prevention of pressure injury  Description  Document Hayden Scale and appropriate interventions in the flowsheet.   Note: Pressure Injury Interventions:  Sensory Interventions: Assess changes in LOC, Float heels, Pressure redistribution bed/mattress (bed type), Keep linens dry and wrinkle-free, Minimize linen layers    Moisture Interventions: Absorbent underpads, Apply protective barrier, creams and emollients, Minimize layers    Activity Interventions: Pressure redistribution bed/mattress(bed type)    Mobility Interventions: Pressure redistribution bed/mattress (bed type), HOB 30 degrees or less    Nutrition Interventions: Offer support with meals,snacks and hydration, Document food/fluid/supplement intake    Friction and Shear Interventions: Apply protective barrier, creams and emollients, Lift sheet, Minimize layers

## 2019-12-01 NOTE — PROGRESS NOTES
Problem: Pressure Injury - Risk of  Goal: *Prevention of pressure injury  Description  Document Hayden Scale and appropriate interventions in the flowsheet.   12/1/2019 1703 by Clementina Gomez RN  Note: Pressure Injury Interventions:  Sensory Interventions: Assess changes in LOC, Float heels, Check visual cues for pain, Pressure redistribution bed/mattress (bed type), Keep linens dry and wrinkle-free, Minimize linen layers    Moisture Interventions: Absorbent underpads, Apply protective barrier, creams and emollients, Minimize layers    Activity Interventions: Pressure redistribution bed/mattress(bed type)    Mobility Interventions: Float heels, Pressure redistribution bed/mattress (bed type), HOB 30 degrees or less    Nutrition Interventions: Document food/fluid/supplement intake, Offer support with meals,snacks and hydration    Friction and Shear Interventions: Apply protective barrier, creams and emollients, Lift sheet, Minimize layers, HOB 30 degrees or less             12/1/2019 1701 by Clementina Gomez RN  Outcome: Progressing Towards Goal  Note: Pressure Injury Interventions:  Sensory Interventions: Assess changes in LOC, Float heels, Check visual cues for pain, Pressure redistribution bed/mattress (bed type), Keep linens dry and wrinkle-free, Minimize linen layers    Moisture Interventions: Absorbent underpads, Apply protective barrier, creams and emollients, Minimize layers    Activity Interventions: Pressure redistribution bed/mattress(bed type)    Mobility Interventions: Float heels, Pressure redistribution bed/mattress (bed type), HOB 30 degrees or less    Nutrition Interventions: Document food/fluid/supplement intake, Offer support with meals,snacks and hydration    Friction and Shear Interventions: Apply protective barrier, creams and emollients, Lift sheet, Minimize layers, HOB 30 degrees or less                Problem: Falls - Risk of  Goal: *Absence of Falls  Description  Document Adam Gonzalez Risk and appropriate interventions in the flowsheet. 12/1/2019 1703 by Maria Isabel Archer RN  Note: Fall Risk Interventions:  Mobility Interventions: Communicate number of staff needed for ambulation/transfer, Bed/chair exit alarm, Patient to call before getting OOB    Mentation Interventions: Adequate sleep, hydration, pain control, Door open when patient unattended, Family/sitter at bedside, More frequent rounding, Bed/chair exit alarm, Reorient patient    Medication Interventions: Bed/chair exit alarm, Patient to call before getting OOB    Elimination Interventions: Call light in reach, Bed/chair exit alarm, Patient to call for help with toileting needs           12/1/2019 1701 by Maria Isabel Archer RN  Outcome: Progressing Towards Goal  Note: Fall Risk Interventions:  Mobility Interventions: Communicate number of staff needed for ambulation/transfer, Bed/chair exit alarm, Patient to call before getting OOB    Mentation Interventions: Adequate sleep, hydration, pain control, Door open when patient unattended, Family/sitter at bedside, More frequent rounding, Bed/chair exit alarm, Reorient patient    Medication Interventions: Bed/chair exit alarm, Patient to call before getting OOB    Elimination Interventions: Call light in reach, Bed/chair exit alarm, Patient to call for help with toileting needs              Problem: Comfort Deficit  Goal: Reduce/control pain  Description  Patient will report that pain has been reduced or controlled through verbal and nonverbal means and that measures to promote comfort are effective. 12/1/2019 1703 by Maria Isabel Archer RN  Note:   Patient has denied pain today. PRN medications available but not required today. Continue to assess, monitor and treat per POC.   12/1/2019 1701 by Maria Isabel Archer RN  Outcome: Progressing Towards Goal  Note:   Patient denies pain. No PRNs medication required today. Continue to assess, monitor and treat per POC.

## 2019-12-01 NOTE — PROGRESS NOTES
2043  C/o pain to neck unrelieved by change of position. Noted grimace. PRN Fentanyl as per MARs. Both saline locks flush without difficulty.   Will continue to monitor

## 2019-12-01 NOTE — PROGRESS NOTES
NAME OF PATIENT:  [place]    LEVEL OF CARE:  Routine       O2 SAFETY:  Concentrator positioning (6\" from furniture/drapes), Tanks stored in cristina  and No petroleum based products on face while oxygen in use    FALL INTERVENTIONS PROVIDED:   Implemented/recommended use of non-skid footwear, Implemented/recommended use of fall risk identification flag to all team members, Implemented/recommended assistive devices and encouraged their use, Implemented/recommended resources for alarm system (personal alarm, bed alarm, call bell, etc.) , Implemented/recommended environmental changes (remove hazards, lower bed, improve lighting, etc.) and Implemented/recommended increased supervision/assistance    INTERDISPLINARY COMMUNICATION/COLLABORATION:  Physician, MSW, [place] and RN, CNA    NEW MEDICATION INITIATION DOCUMENTATION:  N/A    Reason medication is being initiated:  N/A    MD / Provider name consulted re: change in status / initiation of new medication:  N/A    New Symptom(s):  N/A    New Order(s):  N/A    Name of the person notified of the changes:  N/A    Name of person being taught:  N/A    Instructions given:  N/A    Side Effects taught:  N/A    Response to teaching:  N/A      COMFORTABLE DYING MEASURE:  Is Patient/family satisfied with symptom level?  yes    DISCHARGE PLAN:  Pending  1915 Change of shift report Evelyne Anderson, [address] Patient in bed watching tv with family members. Patient denied having pain at this time. Patient asked for assistance to the bedside commode . I assisted the patient with a family member who offered to help. Patient had small BM. Patient returned to bed with help of Darius Renteria LPN. Pt positioned for comfort. 2040 Pt family member came to Nurse's station and said the patient was having neck pain. Spoke with TOMAS White regarding pt c/o pain. IV Fentanyl given by Sneha Quinonez RN for pain. Patient repositioned for comfort. 2240 Patient resting in bed. Appears comfortable.   Family at bedside. 0000 Patient sleeping. Respirations even and unlabored. No signs of pain at this time. Family remains at bedside. 0200 Patient sleeping, appears comfortable. 0400 Patient resting quietly with eyes opened. Patient denies any pain or discomfort at this time. 0600 Patient resting quietly with eyes closed. No signs of pain. Respirations even and unlabored. 0700 Change of shift report given to [place], RN.

## 2019-12-01 NOTE — PROGRESS NOTES
0700-Report received from [facility]  0845-Assessment completed; schedule Pericolace administered; pt denies pain, SOB or discomfort; 02 in place  NEURO: patient alert and conversant; follows commands and answers simple questions appropriately; oriented to person, current situation and season  RESP: on 2L 02 for comfort; lungs clear throughout; pt denies SOB or dyspnea  GI: takes p.o. without difficulty; abdominal/hiatal hernia soft and non-tender; bowel sounds present throughout  : low urine output; uses urinal vs bedside commode  MUSCK: generalized weakness; max assist transfer--otherwise bedrest  SKIN: grossly intact  CV: irregular heart rhythm; murmur heard  0930-Patient sitting up in bed; patient ate 75% of breakfast; pt removed 02--reports he did not need the 02 and does not feel SOB  1030-Right AC IV flushed--no difficulties  1130-patient turned onto right side  1300-patient ate 25% of lunch  1400-patient turned onto right side  1600-patient voided 100cc in urinal; pt assisted to bedside commode but was unable to have a BM; patient bathed and returned to bed with fresh sheets; pt placed supine  1715-Pericolace administered; pt with some SOB and expiratory wheezing  1730call placed to Dr Honey Verde given for SL Roxanol and Duoneb tx  1738-Duoneb administered; wheezing and SOB resolved  1830-pt refused dinner; resting quietly in bed; family at bedside; no current needs  1900-Report given to Jason Connell      NAME OF PATIENT:  Sage Wilson    LEVEL OF CARE:  Routine    REASON FOR GIP:   n/a    *PATIENT REMAINS ELIGIBLE FOR GIP LEVEL OF CARE AS EVIDENCED BY: (MUST BE ADDRESSED OF PATIENT GIP)      REASON FOR RESPITE:  n/a    O2 SAFETY:  Concentrator positioning (6\" from furniture/drapes), Tanks stored in cristina  and No petroleum based products on face while oxygen in use    FALL INTERVENTIONS PROVIDED:   Implemented/recommended use of non-skid footwear, Implemented/recommended use of fall risk identification flag to all team members, Implemented/recommended assistive devices and encouraged their use, Implemented/recommended resources for alarm system (personal alarm, bed alarm, call bell, etc.) , Implemented/recommended environmental changes (remove hazards, lower bed, improve lighting, etc.) and Implemented/recommended increased supervision/assistance    INTERDISPLINARY COMMUNICATION/COLLABORATION:  Physician, MSW, [place] and RN, CNA    NEW MEDICATION INITIATION DOCUMENTATION:  n/a    Reason medication is being initiated:  n/a    MD / Provider name consulted re: change in status / initiation of new medication:  n/a    New Symptom(s):  n/a    New Order(s):  n/a    Name of the person notified of the changes:  n/a    Name of person being taught:  n/a    Instructions given:  n/a    Side Effects taught:  n/a    Response to teaching:  n/a      COMFORTABLE DYING MEASURE:  Is Patient/family satisfied with symptom level?  yes    DISCHARGE PLAN:  EOL

## 2019-12-02 PROCEDURE — 74011250637 HC RX REV CODE- 250/637: Performed by: INTERNAL MEDICINE

## 2019-12-02 PROCEDURE — G0299 HHS/HOSPICE OF RN EA 15 MIN: HCPCS

## 2019-12-02 PROCEDURE — 0651 HSPC ROUTINE HOME CARE

## 2019-12-02 PROCEDURE — G0300 HHS/HOSPICE OF LPN EA 15 MIN: HCPCS

## 2019-12-02 PROCEDURE — 3336590001 HSPC ROOM AND BOARD

## 2019-12-02 RX ADMIN — SENNOSIDES AND DOCUSATE SODIUM 1 TABLET: 8.6; 5 TABLET ORAL at 09:00

## 2019-12-02 RX ADMIN — SENNOSIDES AND DOCUSATE SODIUM 1 TABLET: 8.6; 5 TABLET ORAL at 18:32

## 2019-12-02 NOTE — HOSPICE
This was initial visit to offer support and assess for needs. Wife, son and a daughter was at bedside. Upon arrival patient appeared to be sleeping. Family informed me that the plan was to take him home to honor his wishes to return home. Affirmed their plan to honor his wishes as far as possible. His  Wife daughter noted that prayers are always helpful in response to me asking, how I could best support them. Mr Isabel Ford got in the conversation at this point. He affirmed his desire to go home. He used humor in a wonderful way and we all laughed. He wanted prayers and prayers were offered.

## 2019-12-02 NOTE — PROGRESS NOTES
[facility] Hospice Monitoring   December 2, 2019  Attending: Dr. Louise Winn  Pharmacist monitoring provided for this 80y.o. year old male at [facility]. Principle Hospice Diagnosis: End Stage Cardiac Disease  Diagnoses RELATED to the terminal prognosis: severe Aortic Stenosis, CAD, HTN, CKD4,   Other Diagnoses: Prostate Cancer    Level of care: Routine  Length of stay:   Day 6    Active Problem List:  1. Complex hospitalization transfer  2. Advanced CKD and discontinuation of dialysis  3. Generalized weakness  4. Anorexia  5. Moderate shortness of breath / Class III NYHA symptoms  6. *Patient has a pacemaker but outside notes did not indicate this was an AICD      Current Facility-Administered Medications:     morphine (ROXANOL) concentrated oral syringe 5 mg, 5 mg, SubLINGual, Q15MIN PRN    albuterol-ipratropium (DUO-NEB) 2.5 MG-0.5 MG/3 ML, 3 mL, Nebulization, Q4H PRN    bisacodyl (DULCOLAX) suppository 10 mg, 10 mg, Rectal, DAILY PRN    senna-docusate (PERICOLACE) 8.6-50 mg per tablet 1 Tab, 1 Tab, Oral, BID    melatonin tablet 3 mg (Patient Supplied), 3 mg, Oral, QHS    fentaNYL citrate (PF) injection 25 mcg, 25 mcg, IntraVENous, Q15MIN PRN    haloperidol (HALDOL) 2 mg/mL oral solution 1 mg, 1 mg, Oral, Q6H PRN    traZODone (DESYREL) tablet 100 mg, 100 mg, Oral, QHS PRN    Treatment Goal Check List     Admitting dianosis treated appropriately : YES    Nausea/Vomiting controlled: N/A    Pain Controlled: YES    Agitation/Anxiety/ Delirium controlled: N/A    Depression controlled: N/A    Secretions controlled :     Constipation/Bowel routine controlled: YES    SOB/ Dyspnea controlled: YES    Insomnia: YES    Medication Supply Assessment:  1) Pericolace BID    Pharmacist Medication Review    There were no patient \"home supplied\" medications for the pharmacist to review. Current orders are supplied from [facility] inventory.  The current medication orders have been reviewed and are appropriate. Comments/Recommendations:   Melatonin 3 mg (patient supplied) QHS was ordered. The medication was not available for reconciliation.     Signed: Nadia Avelar PharmD   Phone: [phone]

## 2019-12-02 NOTE — PROGRESS NOTES
Problem: Pressure Injury - Risk of  Goal: *Prevention of pressure injury  Description  Document Hayden Scale and appropriate interventions in the flowsheet. Outcome: Progressing Towards Goal  Note: Pressure Injury Interventions:  Sensory Interventions: Assess changes in LOC, Float heels, Check visual cues for pain, Pressure redistribution bed/mattress (bed type), Keep linens dry and wrinkle-free, Minimize linen layers    Moisture Interventions: Absorbent underpads, Apply protective barrier, creams and emollients, Minimize layers    Activity Interventions: Pressure redistribution bed/mattress(bed type)    Mobility Interventions: Float heels, Pressure redistribution bed/mattress (bed type), HOB 30 degrees or less    Nutrition Interventions: Document food/fluid/supplement intake, Offer support with meals,snacks and hydration    Friction and Shear Interventions: Apply protective barrier, creams and emollients, Lift sheet, Minimize layers, HOB 30 degrees or less                Problem: Patient Education: Go to Patient Education Activity  Goal: Patient/Family Education  Outcome: Progressing Towards Goal     Problem: Falls - Risk of  Goal: *Absence of Falls  Description  Document Obdulio Fall Risk and appropriate interventions in the flowsheet.   Outcome: Progressing Towards Goal  Note: Fall Risk Interventions:  Mobility Interventions: Communicate number of staff needed for ambulation/transfer, Bed/chair exit alarm, Patient to call before getting OOB    Mentation Interventions: Adequate sleep, hydration, pain control, Door open when patient unattended, Family/sitter at bedside, More frequent rounding, Bed/chair exit alarm, Reorient patient    Medication Interventions: Bed/chair exit alarm, Patient to call before getting OOB    Elimination Interventions: Call light in reach, Bed/chair exit alarm, Patient to call for help with toileting needs              Problem: Patient Education: Go to Patient Education Activity  Goal: Patient/Family Education  Outcome: Progressing Towards Goal     Problem: Emotional Support Needs  Goal: Patient/family is receiving emotional support  Description  Patient and family will receive emotional support throughout GIP stay at [facility]. Outcome: Progressing Towards Goal     Problem: Comfort Deficit  Goal: Reduce/control pain  Description  Patient will report that pain has been reduced or controlled through verbal and nonverbal means and that measures to promote comfort are effective.   Outcome: Progressing Towards Goal

## 2019-12-02 NOTE — PROGRESS NOTES
0700  Report received. 0740  Pt lying in bed asleep. Pt aroused to touch. Pt is alert and appropriate. No co pain or dyspnea. Lungs clear. Pt is on RA. + bowel sounds. Last reported BM was 12-1.  (small)  Pt voids in a urinal or on the BSC.  + 2 edema in LE. Skin is warm to touch. Sacrum with calmoseptine. Pt has and IV in his RAC. Dressing is clear and intact. 0855  Pt tolerated PO med without difficulty. 0930  Pt refused breakfast today. No needs. 1100  Pt resting. Pt is arousable. No complaints    1330  Pt visiting with his wife. [address] in to visit and arrange Discharge. Pt will transfer to [facility] and will return home to his family care  [phone]  Pt asleep. 1800  Pt did not eat julio dinner but sipped on some drink. No needs at this time. Pt anxious to get home  1900  Report given      NAME OF PATIENT:  Lazara Mendez    LEVEL OF CARE:  Routine    REASON FOR GIP:   n/a    *PATIENT REMAINS ELIGIBLE FOR [facility] LEVEL OF CARE AS EVIDENCED BY: (MUST BE ADDRESSED OF PATIENT GIP)   n/a      REASON FOR RESPITE:  n/a      O2 SAFETY:  RA    FALL INTERVENTIONS PROVIDED:   Implemented/recommended use of non-skid footwear, Implemented/recommended use of fall risk identification flag to all team members and Implemented/recommended resources for alarm system (personal alarm, bed alarm, call bell, etc.)     INTERDISPLINARY COMMUNICATION/COLLABORATION:  Physician, MSW, [place] and RN, CNA    NEW MEDICATION INITIATION DOCUMENTATION:  No new medications initiated.       Reason medication is being initiated:  n/a    MD / Provider name consulted re: change in status / initiation of new medication:  n/a    New Symptom(s):  n/a    New Order(s):  n/a    Name of the person notified of the changes:  n/a    Name of person being taught:  n/a    Instructions given:  n/a    Side Effects taught:  n/a    Response to teaching:  n/a      COMFORTABLE DYING MEASURE:  Is Patient/family satisfied with symptom level?  yes    DISCHARGE PLAN:  Pt will remain at the [facility] until his family makes alternative living arrangements.

## 2019-12-02 NOTE — HOSPICE
LCSW met with family at bedside. Patient received in hospital bed and was awake and pleasant. Family shared that his wish is to go home to [employer]. Address [place] [address] [place] [address]. [place]. Family has chosen [facility] to transfer to. LCSW spoke with [address] [place] [address] and they need documents faxed to [phone] for processing. Ezequielcl Edouard will then call family and discuss plans for admission and equipment delivery so that a family member can be there to receive the delivery. When this is done, LCSW will set up transport for patient to go to his [facility] and will complete transfer form.     CHERIE Fermin, [facility]   [phone]

## 2019-12-02 NOTE — PROGRESS NOTES
NAME OF PATIENT:  Leighton Walker    LEVEL OF CARE:  Routine  REASON FOR GIP:   N/A    *PATIENT REMAINS ELIGIBLE FOR GIP LEVEL OF CARE AS EVIDENCED BY: (MUST BE ADDRESSED OF PATIENT GIP)      REASON FOR RESPITE:  N/A    O2 SAFETY:  N/A    FALL INTERVENTIONS PROVIDED:   Implemented/recommended use of non-skid footwear, Implemented/recommended use of fall risk identification flag to all team members, Implemented/recommended assistive devices and encouraged their use, Implemented/recommended resources for alarm system (personal alarm, bed alarm, call bell, etc.) , Implemented/recommended environmental changes (remove hazards, lower bed, improve lighting, etc.) and Implemented/recommended increased supervision/assistance    INTERDISPLINARY COMMUNICATION/COLLABORATION:  Physician, MSW, Walker Turner, RN, CNA and LPN    NEW MEDICATION INITIATION DOCUMENTATION:  N/A    Reason medication is being initiated:  N/A    MD / Provider name consulted re: change in status / initiation of new medication:  N/A    New Symptom(s): N/A    New Order(s):  N/A    Name of the person notified of the changes:  N/A    Name of person being taught:  N/A    Instructions given: N/A    Side Effects taught: N/A    Response to teaching:  N/A      COMFORTABLE DYING MEASURE:  Is Patient/family satisfied with symptom level?  yes    DISCHARGE PLAN:  Pending     1900 Report received from [place]. Patient awake in bed with son at bedside. Denies any pain or discomfort. Skin cool and dry. No shortness of breath noted. 2100 Patient awake in bed with son at bedside. C/O lips being dry. Writer applied moisturizer to lips. Denies any pain or discomfort. 18 Asleep in bed with no acute change   0100 Patient asleep with son at bedside. No signs of pain or discomfort.   0300 Patient awake in bed and was using the urinal but was also assisted to the bedside commode where he had a small bowel movement. Pleasant and cooperative. Denies any pain or discomfort.  Son remains at bedside. 0500 Patient remains asleep with son at bedside. No acute change. 0700 Patient remains asleep.  No acute change

## 2019-12-03 VITALS
DIASTOLIC BLOOD PRESSURE: 54 MMHG | HEART RATE: 70 BPM | OXYGEN SATURATION: 76 % | RESPIRATION RATE: 20 BRPM | TEMPERATURE: 97 F | SYSTOLIC BLOOD PRESSURE: 92 MMHG

## 2019-12-03 PROCEDURE — 74011250637 HC RX REV CODE- 250/637: Performed by: INTERNAL MEDICINE

## 2019-12-03 PROCEDURE — 3336590001 HSPC ROOM AND BOARD

## 2019-12-03 PROCEDURE — 0651 HSPC ROUTINE HOME CARE

## 2019-12-03 RX ADMIN — SENNOSIDES AND DOCUSATE SODIUM 1 TABLET: 8.6; 5 TABLET ORAL at 08:34

## 2019-12-03 NOTE — PROGRESS NOTES
NAME OF PATIENT:  Leighton Walker    LEVEL OF CARE:  Routine  REASON FOR GIP:   N/A    *PATIENT REMAINS ELIGIBLE FOR GIP LEVEL OF CARE AS EVIDENCED BY: (MUST BE ADDRESSED OF PATIENT GIP)      REASON FOR RESPITE:  N/A    O2 SAFETY:  N/A    FALL INTERVENTIONS PROVIDED:   Implemented/recommended use of non-skid footwear, Implemented/recommended use of fall risk identification flag to all team members, Implemented/recommended assistive devices and encouraged their use, Implemented/recommended resources for alarm system (personal alarm, bed alarm, call bell, etc.) , Implemented/recommended environmental changes (remove hazards, lower bed, improve lighting, etc.) and Implemented/recommended increased supervision/assistance    INTERDISPLINARY COMMUNICATION/COLLABORATION:  Physician, MSW, Walker Turner, RN, CNA and N/A    NEW MEDICATION INITIATION DOCUMENTATION:  N/A    Reason medication is being initiated:  N/A    MD / Provider name consulted re: change in status / initiation of new medication:  N/A    New Symptom(s):  N/A    New Order(s):  N/A    Name of the person notified of the changes:  N/A    Name of person being taught:  N/A    Instructions given:  N/A    Side Effects taught: N/A    Response to teaching:  N/A    COMFORTABLE DYING MEASURE:  Is Patient/family satisfied with symptom level?  yes    DISCHARGE PLAN:  Home    1900  report from [facility]. Patient in bed awake with nephew at bedside. He is alert and smiling. Denies c/o pain or discomfort. No respiratory distress noted. 2100 Patient asleep off and on in bed with family at bedside. No complaints of pain or discomfort. 2300 Patient asleep in bed with no signs of pain or discomfort. Family at bedside. 0100 Patient asleep in bed with no signs of pain or discomfort. No respiratory distress noted. Family at bedside. 0300 patient remains asleep. No acute change. 0500 Patient asleep in bed with no signs of pain or discomfort. Family at bedside.   3024 Removed IV from right arm without issue. No bleeding at site. Patient tolerated well and denies any discomfort.

## 2019-12-03 NOTE — PROGRESS NOTES
..  0700:Report received from [facility] using SBAR I/O and Kardex. Pt is awake alert and oriented times 2-3 with periods of confusion resp even and non labored hyperactive bowel sounds in all 4 quadrants. Pt denies pain comfort and safety maintained. 0900: Adm senna, tolerated well.  1100:Family at bedside pt resting with eyes closed updated on equipment delivery. 13:30:Pt resting quietly denies pain equipment will be delivered at 4 pm.  15:30:Pt is on commode discharge setup for 4:30 by CHERIE Saunders, family is aware.  1600:[facility] is here for transport report given, pt refusing pain meds for discharge inform daughter also stated since he did not have the morphine while he was here to not give him educated pt to keep o2 on while in transport. I have been communicating with [facility] throughout the day spoke with Cindy MARIN and [address] [place] [address]. Arnav Kimbrough will tuck pt in due to late discharge. 16:30:Report called to Arnav Kimbrough RN scan discharge  and meds list to her via email.         NAME OF PATIENT: Polo Priest   LEVEL OF CARE: Routine  REASON FOR GIP: N/A     *PATIENT REMAINS ELIGIBLE FOR [facility] LEVEL OF CARE AS EVIDENCED BY:N/A    REASON FOR RESPITE:N/A       O2 SAFETY:  Concentrator positioning (6\" from furniture/drapes), Tanks stored in cristina , No petroleum based products on face while oxygen in use and Oxygen sign on the door     FALL INTERVENTIONS PROVIDED:   Implemented/recommended use of fall risk identification flag to all team members, Implemented/recommended assistive devices and encouraged their use, Implemented/recommended resources for alarm system (personal alarm, bed alarm, call bell, etc.)  and Implemented/recommended environmental changes (remove hazards, lower bed, improve lighting, etc.)     INTERDISPLINARY COMMUNICATION/COLLABORATION:  Physician, MSW, [place] and RN, CNA     NEW MEDICATION INITIATION DOCUMENTATION:N/A      Reason medication is being initiated:  N/A     MD / Provider name consulted re: change in status / initiation of new medication:  N/A     New Symptom(s):  N/A     New Order(s):  N/A     Name of the person notified of the changes:  N/A     Name of person being taught:  N/A     Instructions given:  N/A     Side Effects taught:  N/A     Response to teaching:  N/A        COMFORTABLE DYING MEASURE:  Is Patient/family satisfied with symptom level?  yes     DISCHARGE PLAN:Pt will be discharge today.

## 2019-12-03 NOTE — PROGRESS NOTES
Problem: Pressure Injury - Risk of  Goal: *Prevention of pressure injury  Description  Document Hayden Scale and appropriate interventions in the flowsheet. Outcome: Progressing Towards Goal  Note: Pressure Injury Interventions:  Sensory Interventions: Assess changes in LOC, Check visual cues for pain    Moisture Interventions: Apply protective barrier, creams and emollients, Minimize layers    Activity Interventions: Pressure redistribution bed/mattress(bed type)    Mobility Interventions: HOB 30 degrees or less, Pressure redistribution bed/mattress (bed type)    Nutrition Interventions: Offer support with meals,snacks and hydration    Friction and Shear Interventions: HOB 30 degrees or less, Lift sheet                Problem: Patient Education: Go to Patient Education Activity  Goal: Patient/Family Education  Outcome: Progressing Towards Goal     Problem: Falls - Risk of  Goal: *Absence of Falls  Description  Document Obdulio Fall Risk and appropriate interventions in the flowsheet. Outcome: Progressing Towards Goal  Note: Fall Risk Interventions:  Mobility Interventions: Bed/chair exit alarm, Patient to call before getting OOB    Mentation Interventions: Bed/chair exit alarm, Door open when patient unattended    Medication Interventions: Bed/chair exit alarm, Patient to call before getting OOB    Elimination Interventions: Call light in reach, Bed/chair exit alarm              Problem: Patient Education: Go to Patient Education Activity  Goal: Patient/Family Education  Outcome: Progressing Towards Goal     Problem: Emotional Support Needs  Goal: Patient/family is receiving emotional support  Description  Patient and family will receive emotional support throughout GIP stay at [facility].    Outcome: Progressing Towards Goal     Problem: Comfort Deficit  Goal: Reduce/control pain  Description  Patient will report that pain has been reduced or controlled through verbal and nonverbal means and that measures to promote comfort are effective.   Outcome: Progressing Towards Goal

## 2019-12-03 NOTE — DISCHARGE SUMMARY
Discharge Summary    [facility]  [facility] to Those in Need  [phone]      Date of Admission: 11/27/2019  Date of Discharge:  12/03/19    Joshua Montgomery is a 80y.o. year old who was admitted to [facility] with a Hospice diagnosis of end stage heart disease, ESRD. Pt was admitted for [facility] level care from [facility]. Per HPI by Dr. Celi Stringer:  Luisa An is a 80y.o. year old who was admitted to [facility]. The patient's principle diagnosis of End Stage Heart Disease is related to his severe Aortic Stenosis, CAD, HTN, and also has secondary affect of CKD IV. He was admitted to the outside hospital for shortness of breath and lower extremity edema indicating heart failure and was being worked up for TAVR procedure but during this process he had a valvuloplasty (11/18/19) and did not tolerate this indicating he would not tolerate the actual TAVR. He was also receiving dialysis because of progressive CKD and elected to discontinue this due to quality of life; he was needing dobutamine to sustain his blood pressure and also could no longer tolerate dialysis. Functionally, the patient's Karnofsky and/or Palliative Performance Scale has declined over a period of months and is estimated at 30. Objective information that support this patients limited prognosis includes: admission creatinine at [facility] was 5; platelets were 62, 571PWL declined to 25,000. He is making very little urine, just a few hundred cc/day at most. The patient/family chose comfort measures with the support of Hospice. \"    Hospice House Course:  He was admitted [facility] for complex hospitalization / recent discontinuation of Dobutamine and HD and high risk of sudden symptomatic decline. His oral cardiac medications were discontinued at time of transfer. He remained stable from the time of admission and did not experience further decline or acute severe worsening of symptoms and was transitioned to [address] [place] [address] does not provide services in his hometown ([place] away), so he was discharged home with [facility] for ongoing hospice care. Discharge physical exam:  Visit Vitals  BP 92/54 (BP 1 Location: Right arm, BP Patient Position: Supine; At rest)   Pulse 70   Temp 97 °F (36.1 °C)   Resp 20   SpO2 (!) 76% Comment: pt has nasal cannula off, place it back on pt     Constitutional: alert, oriented, smiles and participates in conversation when asked  Eyes: pupils equal, anicteric  ENMT: no nasal discharge, moist oral mucous membranes  Cardiovascular: regular at time of exam  Respiratory: breathing not labored, symmetric, clear anteriorly  Gastrointestinal: soft non-tender, +bowel sounds, large abdominal reducible hernias, groin bandages from prior catheterizations (valvuloplasty)  Musculoskeletal: no deformity, no tenderness to palpation. , 1+ edema  Skin: warm, dry  Neurologic: following commands, moving all extremities  Psychiatric: full affect, no hallucinations    Discharge Condition and Destination:  Stable condition; discharged to home with hospice    Darrius Nava MD  12/03/19